# Patient Record
Sex: MALE | Race: BLACK OR AFRICAN AMERICAN | NOT HISPANIC OR LATINO | Employment: OTHER | URBAN - METROPOLITAN AREA
[De-identification: names, ages, dates, MRNs, and addresses within clinical notes are randomized per-mention and may not be internally consistent; named-entity substitution may affect disease eponyms.]

---

## 2024-09-23 ENCOUNTER — PRE VISIT (OUTPATIENT)
Dept: UROLOGY | Facility: CLINIC | Age: 64
End: 2024-09-23

## 2024-09-23 NOTE — TELEPHONE ENCOUNTER
Reason for visit: consult for      Dx/Hx/Sx: prostate cancer     Records/imaging/labs/orders: in epic     At Rooming: standard

## 2024-09-30 NOTE — TELEPHONE ENCOUNTER
Action 10/02/24  12:58 PM RANJANA   Action Taken  Pathology slides received. 12 slides, case #[id number]. Sent to 5th floor pathology lab for consult.      Action 2024 JTV 3:15 PM    Action Taken CSS RECEIVED AND RESOLVED IMAGES FROM [facility] [place], [place].   MEDICAL RECORDS REQUEST   [place] [facility]  Urology Clinic  [address] [place] [id number]  PHONE: [phone]  Fax: [phone]        FUTURE VISIT INFORMATION                                                   Godwin Murcia, : 1960 scheduled for future visit at [facility]    APPOINTMENT INFORMATION:  Date: 10/8/2024   Provider:  Dr. Colon  Reason for Visit/Diagnosis: Prostate Cancer     REFERRAL INFORMATION:  Referring provider:    RECORDS REQUESTED FOR VISIT                                                     Action    Action Taken 2024 3:16pm [place] [facility] faxed over a request for imaging to [facility] [place], [place]     NOTES  STATUS/DETAILS   OFFICE NOTE from other specialist  yes- CE   PROSTATE CANCER     MRI PROSTATE (IMAGES & REPORT) CE CT abdomen Pelvis 9/10/2024   MRI Prostate 9/10/2024    BONE SCAN (IMAGES & REPORT) CE NM Bone Whole Body 9/10/2024    PATHOLOGY REPORT & SLIDES Received - [facility] [place], [place]    [employer] Tracking Number: [id number]    2024   [id number]   Prostate Biopsy    PSA (LAB)  yes     PRE-VISIT CHECKLIST      RECORD COLLECTION COMPLETE YES

## 2024-10-03 ENCOUNTER — LAB REQUISITION (OUTPATIENT)
Dept: LAB | Facility: CLINIC | Age: 64
End: 2024-10-03
Payer: COMMERCIAL

## 2024-10-03 PROCEDURE — 88321 CONSLTJ&REPRT SLD PREP ELSWR: CPT | Mod: GC | Performed by: PATHOLOGY

## 2024-10-06 ENCOUNTER — HEALTH MAINTENANCE LETTER (OUTPATIENT)
Age: 64
End: 2024-10-06

## 2024-10-07 LAB
PATH REPORT.COMMENTS IMP SPEC: ABNORMAL
PATH REPORT.COMMENTS IMP SPEC: YES
PATH REPORT.FINAL DX SPEC: ABNORMAL
PATH REPORT.GROSS SPEC: ABNORMAL
PATH REPORT.MICROSCOPIC SPEC OTHER STN: ABNORMAL
PATH REPORT.RELEVANT HX SPEC: ABNORMAL
PATH REPORT.RELEVANT HX SPEC: ABNORMAL
PATH REPORT.SITE OF ORIGIN SPEC: ABNORMAL

## 2024-10-08 ENCOUNTER — OFFICE VISIT (OUTPATIENT)
Dept: UROLOGY | Facility: CLINIC | Age: 64
End: 2024-10-08
Payer: COMMERCIAL

## 2024-10-08 ENCOUNTER — PRE VISIT (OUTPATIENT)
Dept: UROLOGY | Facility: CLINIC | Age: 64
End: 2024-10-08

## 2024-10-08 VITALS
SYSTOLIC BLOOD PRESSURE: 124 MMHG | BODY MASS INDEX: 26.66 KG/M2 | DIASTOLIC BLOOD PRESSURE: 88 MMHG | HEART RATE: 78 BPM | HEIGHT: 65 IN | WEIGHT: 160 LBS

## 2024-10-08 DIAGNOSIS — C61 PROSTATE CANCER (H): Primary | ICD-10-CM

## 2024-10-08 PROBLEM — E11.9 TYPE 2 DIABETES MELLITUS WITHOUT COMPLICATION, WITHOUT LONG-TERM CURRENT USE OF INSULIN (H): Status: ACTIVE | Noted: 2021-03-29

## 2024-10-08 PROBLEM — E78.49 OTHER HYPERLIPIDEMIA: Status: ACTIVE | Noted: 2018-08-23

## 2024-10-08 PROCEDURE — 99204 OFFICE O/P NEW MOD 45 MIN: CPT | Performed by: UROLOGY

## 2024-10-08 RX ORDER — CLOBETASOL PROPIONATE 0.5 MG/G
OINTMENT TOPICAL
COMMUNITY
Start: 2024-06-05

## 2024-10-08 RX ORDER — CETIRIZINE HYDROCHLORIDE 10 MG/1
10 TABLET ORAL DAILY
COMMUNITY
Start: 2024-05-01

## 2024-10-08 RX ORDER — ALBUTEROL SULFATE 90 UG/1
1-2 INHALANT RESPIRATORY (INHALATION)
COMMUNITY
Start: 2024-05-01

## 2024-10-08 RX ORDER — FAMOTIDINE 20 MG/1
20 TABLET, FILM COATED ORAL
COMMUNITY
Start: 2024-02-19

## 2024-10-08 RX ORDER — ATORVASTATIN CALCIUM 40 MG/1
40 TABLET, FILM COATED ORAL DAILY
COMMUNITY
Start: 2024-05-01

## 2024-10-08 RX ORDER — HYDROCODONE BITARTRATE AND ACETAMINOPHEN 5; 325 MG/1; MG/1
1 TABLET ORAL
COMMUNITY

## 2024-10-08 RX ORDER — VITAMIN B COMPLEX
1 TABLET ORAL DAILY
COMMUNITY
Start: 2024-05-14

## 2024-10-08 ASSESSMENT — PAIN SCALES - GENERAL: PAINLEVEL: NO PAIN (0)

## 2024-10-08 NOTE — LETTER
10/8/2024       RE: Godwin Murcia  [address]  [place] [address]     Dear Colleague,    Thank you for referring your patient, Godwin Murcia, to the [facility] [place] [facility]. Please see a copy of my visit note below.          Chief Complaint:   Prostate Cancer         Consult or Referral:     Godwin Murcia is a 64 year old male seen at the request of Dr. Sidney Schafer MD.         History of Present Illness:   Godwin Murcia is a very pleasant 64 year old male who presents with a history of Prostate Cancer. PSA 18.60. Franklin 4+3=7 disease. [place] [facility] [place] interested in discussion about focal therapy. He has previously discussion options for management including radiation and surgical management including robot-assisted prostatectomy. He notes he is not interested in surgical management due to the risk of urinary incontinence, erectile dysfunction, and the need for an indwelling pastrana catheter post-operatively. He is most interested in focal therapy treatments with a lower risk of these side effects.     He has no significant cardiopulmonary history, no history of radiation, and no history of intra-abdominal surgery. He is not on anticoagulation.     TRUS 8/20/2024  Final Diagnosis   CASE FROM [place], [place] ([id number], OBTAINED 08/02/2024):  A. Prostate, left, needle biopsy:  - Prostate adenocarcinoma, acinar type  - Climax score 6 (3+3)  - Grade group 1  - Extent: 1 of 6 cores (4 mm, 5% all of the cores)  - Perineural invasion is not identified     B. Prostate, right, needle biopsy:  - Prostate adenocarcinoma, acinar type  - Climax score 7 (4+3)  - Grade group 3  - Extent: 5 of 6 cores (70% all of the cores)  - The percentage of Climax pattern 4 is 90%  - Cribriform pattern is present  - Perineural invasion is present     MRI Prostate 9/10/2024  Size: 3.0 x 3.9 x 4.1 cm;  volume = 24.9 cc   MPRESSION:   1. Based on the most suspicious abnormality, this exam is characterized as PI-RADS 4. The most suspicious abnormality is located at the right peripheral zone mid gland and there is no evidence of extracapsular extension.   2. No suspicious adenopathy or evidence of pelvic metastatic disease.     NM Bone Scan 9/10/2024  Impression    Impression: No scintigraphic evidence of osseous metastatic disease.    CT abd/pelvis 9/10/2024  IMPRESSION:   1.Probable hepatic segment 7 hemangioma.   2.Indeterminate thickening of the distal appendix. No inflammation to suggest appendicitis. In rare cases, appendiceal neoplasm could have this appearance. Reassuringly, there is no solid mass or fluid collection . Recommend follow-up CT of the abdomen/pelvis with IV contrast in 3 months to monitor.   3.Otherwise no metastatic disease in the abdomen or pelvis.   4.See also same-day prostate MRI.          Past Medical History:   HTN  HLD  T2DM  Mild Levt Ventricular Hypertrophy  Tobacco Use         Past Surgical History:   Cheilectomy and soft tissue mass excision (2002)   Back Surgery (2003, 2005)  Right Shoulder Reconstruction (2008)         Medications     Current Outpatient Medications   Medication Sig Dispense Refill     albuterol (PROAIR HFA/PROVENTIL HFA/VENTOLIN HFA) 108 (90 Base) MCG/ACT inhaler Inhale 1-2 puffs into the lungs.       atorvastatin (LIPITOR) 40 MG tablet Take 40 mg by mouth daily.       cetirizine (ZYRTEC) 10 MG tablet Take 10 mg by mouth daily.       clobetasol (TEMOVATE) 0.05 % external ointment Apply to the rash on the ankle and foot every night under occlusive wraps nightly       famotidine (PEPCID) 20 MG tablet Take 20 mg by mouth.       omeprazole (PRILOSEC) 20 MG DR capsule Take 20 mg by mouth.       Vitamin D3 (CHOLECALCIFEROL) 25 mcg (1000 units) tablet Take 1 tablet by mouth daily.       HYDROcodone-acetaminophen (NORCO) 5-325 MG tablet Take 1 tablet by mouth.             "Family History:   No known family history of  malignancy.         Social History:     Social History     Socioeconomic History     Marital status: Single     Spouse name: Not on file     Number of children: Not on file     Years of education: Not on file     Highest education level: Not on file   Occupational History     Not on file   Tobacco Use     Smoking status: Every Day     Types: Cigarettes     Smokeless tobacco: Never   Substance and Sexual Activity     Alcohol use: Not on file     Drug use: Not on file     Sexual activity: Not on file   Other Topics Concern     Not on file   Social History Narrative     Not on file     Social Determinants of Health     Financial Resource Strain: Not on file   Food Insecurity: Not on file   Transportation Needs: Not on file   Physical Activity: Not on file   Stress: Not on file   Social Connections: Not on file   Interpersonal Safety: Not on file   Housing Stability: Not on file          Allergies:   Ciprofloxacin, Morphine, and Oxycodone         Review of Systems:  From intake questionnaire     Skin: negative  Eyes: negative  Ears/Nose/Throat: negative  Respiratory: No shortness of breath, dyspnea on exertion, cough, or hemoptysis  Cardiovascular: No chest pain or palpitations  Gastrointestinal: negative; no nausea/vomiting, constipation or diarrhea  Genitourinary: as per HPI  Musculoskeletal: negative  Neurologic: negative  Psychiatric: negative  Hematologic/Lymphatic/Immunologic: negative  Endocrine: negative         Physical Exam:     Patient is a 64 year old  male   Vitals: Blood pressure 124/88, pulse 78, height 1.651 m (5' 5\"), weight 72.6 kg (160 lb).  Constitutional: Body mass index is 26.63 kg/m . NAD, responds appropriately to questions  Eyes: no scleral icterus; extraocular muscles intact, moist conjunctivae  Respiratory: Non-labored breathing on room air, no use of accessory muscles of respiration   Cardiovascular: Extremities appear well perfused "   Musculoskeletal: no peripheral edema  Skin: no suspicious lesions or rashes  Neuro: Grossly moving all extremities   Psych: affect and mood normal      Labs and Pathology:    I reviewed all applicable laboratory and pathology data and went over findings with patient    Prostate Biopsy 8/2/24 (internal read 10/3/2024)   A. Prostate, left, needle biopsy:  - Prostate adenocarcinoma, acinar type  - Franklin score 6 (3+3)  - Grade group 1  - Extent: 1 of 6 cores (4 mm, 5% all of the cores)  - Perineural invasion is not identified     B. Prostate, right, needle biopsy:  - Prostate adenocarcinoma, acinar type  - Franklin score 7 (4+3)  - Grade group 3  - Extent: 5 of 6 cores (70% all of the cores)  - The percentage of [place] pattern 4 is 90%  - Cribriform pattern is present  - Perineural invasion is present    PSA 18.60      Imaging:    The following imaging exams were independently viewed and interpreted by me and discussed with patient:  MR Prostate (9/11/2024)  IMPRESSION:   1. Based on the most suspicious abnormality, this exam is characterized as PI-RADS 4. The most suspicious abnormality is located at the right peripheral zone mid gland and there is no evidence of extracapsular extension.   2. No suspicious adenopathy or evidence of pelvic metastatic disease.     NM Bone Scam (9/10/24)   Impression: No scintigraphic evidence of osseous metastatic disease.     CT A/P w/ Contrast (9/10/24)  IMPRESSION:   1.Probable hepatic segment 7 hemangioma.   2.Indeterminate thickening of the distal appendix. No inflammation to suggest appendicitis. In rare cases, appendiceal neoplasm could have this appearance. Reassuringly, there is no solid mass or fluid collection . Recommend follow-up CT of the abdomen/pelvis with IV contrast in 3 months to monitor.   3.Otherwise no metastatic disease in the abdomen or pelvis.   4.See also same-day prostate MRI.       Outside and Past Medical records:    Review of prior external note(s) from  - [facility] information from [facility]  reviewed  Review of the result(s) of each unique test - PSA, MRI, pathology, bone scan, CT         Assessment and Plan:     Mr. Murcia is a 64M with history of localized, unfavorable intermediate prostate cancer (PSA 18.6, high volume [place] 4+3=7 with perineural invasion). We discussed treatment options for prostate cancer including radiation, surgery, and focal treatments. He states up front that he has met with radiation oncology and had discussions about surgery, and is strongly opposed to either of these two treatment options.    We discussed that there is limited and as yet emerging data regarding the long-term efficacy of focal therapies We discussed that given the volume and grade of his disease, we would not recommend focal treatment for management of his prostate cancer. Specifically, at [facility] we are enrolling in the VAPOR 2 trial, however he is not a candidate for the VAPOR-2 trial based on his [place] score, PSA, and volume of disease. We discussed that given his PSA and grade group 3 disease, he is at risk of metastatic progression of his disease at which time treatment options would be more limited and surgery would no longer be an option. We also discussed that should he pursue focal therapy he is at higher risk of recurrence and potentially would be at elevated risk of adverse side effects with subsequent treatment. With this in mind, I recommend definitive therapy with surgery or radiation.     He again re-affirmed that he will not consider surgery or radiation. Regarding other options for focal therapy, I suggested he could continue to explore other health systems for additional options. He states he will do this and if he cannot have focal therapy, he will likely pursue no active treatment. We again reviewed potential risks of that approach including metastasis and death.    Plan:  - He will pursue other options for focal therapy. Will  follow-up with me as needed.    Josiane Huerta MD  Urology Resident PGY-2      Attestation:  I, Evan Colon MD, saw this patient with the resident and agree with the resident's findings and plan of care. I personally reviewed the medications, vital signs, labs, and imaging. I have reviewed and edited the note above to reflect my findings. The history and physical exam were performed by me and the pertinent details are outlined above.    45 total minutes spent on the date of the encounter including direct interaction with the patient, performing chart review, documentation and further activities as noted above.    Evan Colon MD  Urology  [facility]          Again, thank you for allowing me to participate in the care of your patient.      Sincerely,    Evan Colon MD

## 2024-10-08 NOTE — PROGRESS NOTES
Chief Complaint:   Prostate Cancer         Consult or Referral:     Godwin Murcia is a 64 year old male seen at the request of Dr. Sidney Schafer MD.         History of Present Illness:   Godwin Murcia is a very pleasant 64 year old male who presents with a history of Prostate Cancer. PSA 18.60. [place] 4+3=7 disease. Has previously been [place] [facility] [place] and is interested in discussion about focal therapy. He has previously discussion options for management including radiation and surgical management including robot-assisted prostatectomy. He notes he is not interested in surgical management due to the risk of urinary incontinence, erectile dysfunction, and the need for an indwelling pastrnaa catheter post-operatively. He is most interested in focal therapy treatments with a lower risk of these side effects.     He has no significant cardiopulmonary history, no history of radiation, and no history of intra-abdominal surgery. He is not on anticoagulation.     TRUS 8/20/2024  Final Diagnosis   CASE FROM [place], [place] ([id number], OBTAINED 08/02/2024):  A. Prostate, left, needle biopsy:  - Prostate adenocarcinoma, acinar type  - Franklin score 6 (3+3)  - Grade group 1  - Extent: 1 of 6 cores (4 mm, 5% all of the cores)  - Perineural invasion is not identified     B. Prostate, right, needle biopsy:  - Prostate adenocarcinoma, acinar type  - [place] score 7 (4+3)  - Grade group 3  - Extent: 5 of 6 cores (70% all of the cores)  - The percentage of [place] pattern 4 is 90%  - Cribriform pattern is present  - Perineural invasion is present     MRI Prostate 9/10/2024  Size: 3.0 x 3.9 x 4.1 cm; volume = 24.9 cc   MPRESSION:   1. Based on the most suspicious abnormality, this exam is characterized as PI-RADS 4. The most suspicious abnormality is located at the right peripheral zone mid gland and there is no evidence of extracapsular extension.   2. No suspicious adenopathy or evidence of pelvic metastatic  disease.     NM Bone Scan 9/10/2024  Impression    Impression: No scintigraphic evidence of osseous metastatic disease.    CT abd/pelvis 9/10/2024  IMPRESSION:   1.Probable hepatic segment 7 hemangioma.   2.Indeterminate thickening of the distal appendix. No inflammation to suggest appendicitis. In rare cases, appendiceal neoplasm could have this appearance. Reassuringly, there is no solid mass or fluid collection . Recommend follow-up CT of the abdomen/pelvis with IV contrast in 3 months to monitor.   3.Otherwise no metastatic disease in the abdomen or pelvis.   4.See also same-day prostate MRI.          Past Medical History:   HTN  HLD  T2DM  Mild Levt Ventricular Hypertrophy  Tobacco Use         Past Surgical History:   Cheilectomy and soft tissue mass excision (2002)   Back Surgery (2003, 2005)  Right Shoulder Reconstruction (2008)         Medications     Current Outpatient Medications   Medication Sig Dispense Refill    albuterol (PROAIR HFA/PROVENTIL HFA/VENTOLIN HFA) 108 (90 Base) MCG/ACT inhaler Inhale 1-2 puffs into the lungs.      atorvastatin (LIPITOR) 40 MG tablet Take 40 mg by mouth daily.      cetirizine (ZYRTEC) 10 MG tablet Take 10 mg by mouth daily.      clobetasol (TEMOVATE) 0.05 % external ointment Apply to the rash on the ankle and foot every night under occlusive wraps nightly      famotidine (PEPCID) 20 MG tablet Take 20 mg by mouth.      omeprazole (PRILOSEC) 20 MG DR capsule Take 20 mg by mouth.      Vitamin D3 (CHOLECALCIFEROL) 25 mcg (1000 units) tablet Take 1 tablet by mouth daily.      HYDROcodone-acetaminophen (NORCO) 5-325 MG tablet Take 1 tablet by mouth.            Family History:   No known family history of  malignancy.         Social History:     Social History     Socioeconomic History    Marital status: Single     Spouse name: Not on file    Number of children: Not on file    Years of education: Not on file    Highest education level: Not on file   Occupational History    Not  "on file   Tobacco Use    Smoking status: Every Day     Types: Cigarettes    Smokeless tobacco: Never   Substance and Sexual Activity    Alcohol use: Not on file    Drug use: Not on file    Sexual activity: Not on file   Other Topics Concern    Not on file   Social History Narrative    Not on file     Social Determinants of Health     Financial Resource Strain: Not on file   Food Insecurity: Not on file   Transportation Needs: Not on file   Physical Activity: Not on file   Stress: Not on file   Social Connections: Not on file   Interpersonal Safety: Not on file   Housing Stability: Not on file          Allergies:   Ciprofloxacin, Morphine, and Oxycodone         Review of Systems:  From intake questionnaire     Skin: negative  Eyes: negative  Ears/Nose/Throat: negative  Respiratory: No shortness of breath, dyspnea on exertion, cough, or hemoptysis  Cardiovascular: No chest pain or palpitations  Gastrointestinal: negative; no nausea/vomiting, constipation or diarrhea  Genitourinary: as per HPI  Musculoskeletal: negative  Neurologic: negative  Psychiatric: negative  Hematologic/Lymphatic/Immunologic: negative  Endocrine: negative         Physical Exam:     Patient is a 64 year old  male   Vitals: Blood pressure 124/88, pulse 78, height 1.651 m (5' 5\"), weight 72.6 kg (160 lb).  Constitutional: Body mass index is 26.63 kg/m . NAD, responds appropriately to questions  Eyes: no scleral icterus; extraocular muscles intact, moist conjunctivae  Respiratory: Non-labored breathing on room air, no use of accessory muscles of respiration   Cardiovascular: Extremities appear well perfused   Musculoskeletal: no peripheral edema  Skin: no suspicious lesions or rashes  Neuro: Grossly moving all extremities   Psych: affect and mood normal      Labs and Pathology:    I reviewed all applicable laboratory and pathology data and went over findings with patient    Prostate Biopsy 8/2/24 (internal read 10/3/2024)   A. Prostate, left, needle " biopsy:  - Prostate adenocarcinoma, acinar type  - Holcomb score 6 (3+3)  - Grade group 1  - Extent: 1 of 6 cores (4 mm, 5% all of the cores)  - Perineural invasion is not identified     B. Prostate, right, needle biopsy:  - Prostate adenocarcinoma, acinar type  - Holcomb score 7 (4+3)  - Grade group 3  - Extent: 5 of 6 cores (70% all of the cores)  - The percentage of Franklin pattern 4 is 90%  - Cribriform pattern is present  - Perineural invasion is present    PSA 18.60      Imaging:    The following imaging exams were independently viewed and interpreted by me and discussed with patient:   Prostate (9/11/2024)  IMPRESSION:   1. Based on the most suspicious abnormality, this exam is characterized as PI-RADS 4. The most suspicious abnormality is located at the right peripheral zone mid gland and there is no evidence of extracapsular extension.   2. No suspicious adenopathy or evidence of pelvic metastatic disease.     NM Bone Scam (9/10/24)   Impression: No scintigraphic evidence of osseous metastatic disease.     CT A/P w/ Contrast (9/10/24)  IMPRESSION:   1.Probable hepatic segment 7 hemangioma.   2.Indeterminate thickening of the distal appendix. No inflammation to suggest appendicitis. In rare cases, appendiceal neoplasm could have this appearance. Reassuringly, there is no solid mass or fluid collection . Recommend follow-up CT of the abdomen/pelvis with IV contrast in 3 months to monitor.   3.Otherwise no metastatic disease in the abdomen or pelvis.   4.See also same-day prostate MRI.       Outside and Past Medical records:    Review of prior external note(s) from [facility]  reviewed  Review of the result(s) of each unique test - PSA, MRI, pathology, bone scan, CT         Assessment and Plan:     Mr. Murcia is a 64M with history of localized, unfavorable intermediate prostate cancer (PSA 18.6, high volume Franklin 4+3=7 with perineural invasion). We  discussed treatment options for prostate cancer including radiation, surgery, and focal treatments. He states up front that he has met with radiation oncology and had discussions about surgery, and is strongly opposed to either of these two treatment options.    We discussed that there is limited and as yet emerging data regarding the long-term efficacy of focal therapies We discussed that given the volume and grade of his disease, we would not recommend focal treatment for management of his prostate cancer. Specifically, at [facility] we are enrolling in the VAPOR 2 trial, however he is not a candidate for the VAPOR-2 trial based on his Franklin score, PSA, and volume of disease. We discussed that given his PSA and grade group 3 disease, he is at risk of metastatic progression of his disease at which time treatment options would be more limited and surgery would no longer be an option. We also discussed that should he pursue focal therapy he is at higher risk of recurrence and potentially would be at elevated risk of adverse side effects with subsequent treatment. With this in mind, I recommend definitive therapy with surgery or radiation.     He again re-affirmed that he will not consider surgery or radiation. Regarding other options for focal therapy, I suggested he could continue to explore other health systems for additional options. He states he will do this and if he cannot have focal therapy, he will likely pursue no active treatment. We again reviewed potential risks of that approach including metastasis and death.    Plan:  - He will pursue other options for focal therapy. Will follow-up with me as needed.    Josiane Huerta MD  Urology Resident PGY-2      Attestation:  I, Evan Colon MD, saw this patient with the resident and agree with the resident's findings and plan of care. I personally reviewed the medications, vital signs, labs, and imaging. I have reviewed and edited the note above to reflect my  findings. The history and physical exam were performed by me and the pertinent details are outlined above.    45 total minutes spent on the date of the encounter including direct interaction with the patient, performing chart review, documentation and further activities as noted above.    Evan Colon MD  Urology  [facility]

## 2024-10-08 NOTE — NURSING NOTE
"Chief Complaint   Patient presents with    Consult For     Prostate cancer        Blood pressure 124/88, pulse 78, height 1.651 m (5' 5\"), weight 72.6 kg (160 lb). Body mass index is 26.63 kg/m .    There is no problem list on file for this patient.      Allergies   Allergen Reactions    Ciprofloxacin      Other Reaction(s): Urticaria    Patient developed urticaria post-operatively following a transrectal ultrasound guided prostate biopsy.  He received ciprofloxacin for prophylaxis prior to the procedure.  Compared to prior anesthetic records, this is the only new agent introduced and is likely the cause of the patient's allergic reaction.    Morphine Nausea and Vomiting    Oxycodone Hives       Current Outpatient Medications   Medication Sig Dispense Refill    albuterol (PROAIR HFA/PROVENTIL HFA/VENTOLIN HFA) 108 (90 Base) MCG/ACT inhaler Inhale 1-2 puffs into the lungs.      atorvastatin (LIPITOR) 40 MG tablet Take 40 mg by mouth daily.      cetirizine (ZYRTEC) 10 MG tablet Take 10 mg by mouth daily.      clobetasol (TEMOVATE) 0.05 % external ointment Apply to the rash on the ankle and foot every night under occlusive wraps nightly      famotidine (PEPCID) 20 MG tablet Take 20 mg by mouth.      omeprazole (PRILOSEC) 20 MG DR capsule Take 20 mg by mouth.      Vitamin D3 (CHOLECALCIFEROL) 25 mcg (1000 units) tablet Take 1 tablet by mouth daily.      HYDROcodone-acetaminophen (NORCO) 5-325 MG tablet Take 1 tablet by mouth.         Social History     Tobacco Use    Smoking status: Every Day     Types: Cigarettes    Smokeless tobacco: Never       Maria Antonia Vega  10/8/2024  8:30 AM     "

## 2024-12-15 ENCOUNTER — HEALTH MAINTENANCE LETTER (OUTPATIENT)
Age: 64
End: 2024-12-15

## 2025-03-16 ENCOUNTER — HEALTH MAINTENANCE LETTER (OUTPATIENT)
Age: 65
End: 2025-03-16

## 2025-06-29 ENCOUNTER — HEALTH MAINTENANCE LETTER (OUTPATIENT)
Age: 65
End: 2025-06-29